# Patient Record
Sex: MALE | Race: WHITE | NOT HISPANIC OR LATINO | Employment: FULL TIME | URBAN - METROPOLITAN AREA
[De-identification: names, ages, dates, MRNs, and addresses within clinical notes are randomized per-mention and may not be internally consistent; named-entity substitution may affect disease eponyms.]

---

## 2022-12-01 ENCOUNTER — TRANSFERRED RECORDS (OUTPATIENT)
Dept: HEALTH INFORMATION MANAGEMENT | Facility: CLINIC | Age: 47
End: 2022-12-01

## 2023-12-16 ENCOUNTER — OFFICE VISIT (OUTPATIENT)
Dept: URGENT CARE | Facility: URGENT CARE | Age: 48
End: 2023-12-16
Payer: COMMERCIAL

## 2023-12-16 ENCOUNTER — ANCILLARY PROCEDURE (OUTPATIENT)
Dept: GENERAL RADIOLOGY | Facility: CLINIC | Age: 48
End: 2023-12-16
Attending: PHYSICIAN ASSISTANT
Payer: COMMERCIAL

## 2023-12-16 VITALS
OXYGEN SATURATION: 97 % | HEART RATE: 59 BPM | TEMPERATURE: 97.3 F | RESPIRATION RATE: 20 BRPM | DIASTOLIC BLOOD PRESSURE: 66 MMHG | SYSTOLIC BLOOD PRESSURE: 122 MMHG | WEIGHT: 201.2 LBS

## 2023-12-16 DIAGNOSIS — R06.02 SHORTNESS OF BREATH: ICD-10-CM

## 2023-12-16 DIAGNOSIS — R05.1 ACUTE COUGH: Primary | ICD-10-CM

## 2023-12-16 PROCEDURE — 93000 ELECTROCARDIOGRAM COMPLETE: CPT | Performed by: PHYSICIAN ASSISTANT

## 2023-12-16 PROCEDURE — 99204 OFFICE O/P NEW MOD 45 MIN: CPT | Mod: 25 | Performed by: PHYSICIAN ASSISTANT

## 2023-12-16 PROCEDURE — 71046 X-RAY EXAM CHEST 2 VIEWS: CPT | Mod: TC | Performed by: RADIOLOGY

## 2023-12-16 RX ORDER — ALBUTEROL SULFATE 90 UG/1
2 AEROSOL, METERED RESPIRATORY (INHALATION) EVERY 6 HOURS
Qty: 18 G | Refills: 0 | Status: SHIPPED | OUTPATIENT
Start: 2023-12-16 | End: 2024-01-05

## 2023-12-16 RX ORDER — BENZONATATE 100 MG/1
100 CAPSULE ORAL 3 TIMES DAILY PRN
Qty: 30 CAPSULE | Refills: 0 | Status: SHIPPED | OUTPATIENT
Start: 2023-12-16 | End: 2023-12-26

## 2023-12-16 RX ORDER — PREDNISONE 20 MG/1
40 TABLET ORAL DAILY
Qty: 10 TABLET | Refills: 0 | Status: SHIPPED | OUTPATIENT
Start: 2023-12-16 | End: 2023-12-21

## 2023-12-16 RX ORDER — DOXYCYCLINE 100 MG/1
100 CAPSULE ORAL 2 TIMES DAILY
Qty: 14 CAPSULE | Refills: 0 | Status: SHIPPED | OUTPATIENT
Start: 2023-12-16 | End: 2023-12-23

## 2023-12-16 NOTE — PROGRESS NOTES
Patient presents with:  URI: Cough with chest congestion for the past week, fatigue, chills/sweats       Clinical Decision Making:  EKG was obtained and showed sinus bradycardia.  No acute findings.  Chest x-ray did not show findings and was within normal limits.  Patient is treated with albuterol, Tessalon Perles and prednisone for the cough and congestion.  Follow-up with primary care provider to reevaluate symptoms. Expected course of resolution and indication for return was gone over and questions were answered to patient/parent's satisfaction before discharge.        ICD-10-CM    1. Acute cough  R05.1 XR Chest 2 Views     albuterol (PROAIR HFA/PROVENTIL HFA/VENTOLIN HFA) 108 (90 Base) MCG/ACT inhaler     benzonatate (TESSALON) 100 MG capsule     predniSONE (DELTASONE) 20 MG tablet     doxycycline hyclate (VIBRAMYCIN) 100 MG capsule     Primary Care Referral      2. Shortness of breath  R06.02 EKG 12-lead complete w/read - Clinics     CANCELED: EKG 12-lead, tracing only          Patient Instructions   You were seen today for acute bronchitis. This is likely due to a viral illness.    Symptom management:  - Get plenty of rest  - Avoid smoking and second hand smoke  - May take tylenol or ibuprofen for fever/discomfort  - Drink plenty of non-caffeinated fluids  - Use nasal steroid spray for sinus congestion  - Albuterol inhaler may be used every 6 hours as needed for chest tightness      Reasons to be seen in the emergency room:  - Develop a fever of 100.4 or higher  - Cough changes, coughing up blood, or become short of breath  - Neck stiffness  - Chest pain  - Severe headache  - Unable to tolerate eating or drinking fluids    Otherwise, if no symptom improvement after 5 days, follow-up with your primary care provider.        HPI:  Garret Sheth is a 48 year old male who presents today for evaluation of cough and chest congestion and fatigue and intermittent dizziness over the last week. Has had nausea and no  vomiting.  Patient also has had chills but no night sweats.  Has not had fever chills night sweats and shortness of breath dyspnea on exertion pleuritic chest pain syncope or presyncope.  COVID test was performed at home 6 days ago and it was negative.    History obtained from chart review and the patient.    Cardiovascular Risk factors:    Male, age 61  Negative Hypertension  Positive hyperlipidemia  Negative diabetes mellitus  Positive obesity  Positive sedentary lifestyle  Positive family history, mother had a heart attack at age 40.  Negative smoker, former smoker from 1998.  Negative alcohol use  Negative illicit street drug use     Problem List:  There are no relevant problems documented for this patient.      History reviewed. No pertinent past medical history.    Social History     Tobacco Use    Smoking status: Never     Passive exposure: Never    Smokeless tobacco: Never   Substance Use Topics    Alcohol use: Not on file       Review of Systems  As above in HPI otherwise negative.    Vitals:    12/16/23 1400   BP: 122/66   BP Location: Right arm   Patient Position: Sitting   Cuff Size: Adult Large   Pulse: 59   Resp: 20   Temp: 97.3  F (36.3  C)   TempSrc: Tympanic   SpO2: 97%   Weight: 91.3 kg (201 lb 3.2 oz)       General: Patient is resting comfortably no acute distress is afebrile  HEENT: Head is normocephalic atraumatic   eyes are PERRL EOMI sclera anicteric   TMs are clear bilaterally  Throat is clear and no exudate  No cervical lymphadenopathy present  LUNGS: Clear to auscultation bilaterally normal respiratory effort and excursion  HEART: Regular rate and rhythm  Skin: Without rash non-diaphoretic    Physical Exam      Labs:  Results for orders placed or performed in visit on 12/16/23   XR Chest 2 Views     Status: None    Narrative    EXAM: XR CHEST 2 VIEWS  LOCATION: [facility]  DATE: 12/16/2023    INDICATION: Acute cough.  COMPARISON: None.      Impression     IMPRESSION:     Lungs are clear. No evidence of pneumonia. No pleural effusions or pneumothorax. Normal cardiac silhouette.     EKG was obtained and shows 53 bpm with normal sinus rhythm with sinus bradycardia.      Radiology:  I have personally ordered and preliminarily reviewed the following xray, I have noted no consolidations.     At the end of the encounter, I discussed results, diagnosis, medications. Discussed red flags for immediate return to clinic/ER, as well as indications for follow up if no improvement. Patient understood and agreed to plan. Patient was stable for discharge.

## 2023-12-28 ENCOUNTER — TELEPHONE (OUTPATIENT)
Dept: FAMILY MEDICINE | Facility: CLINIC | Age: 48
End: 2023-12-28
Payer: COMMERCIAL

## 2023-12-28 NOTE — TELEPHONE ENCOUNTER
Medication Question or Refill        What medication are you calling about (include dose and sig)?: Prednisone refill    Preferred Pharmacy:   [id number] - [place], [place] - [address]  [address] [address]  Phone: [phone] Fax: [phone]      Controlled Substance Agreement on file:   CSA -- Patient Level:    CSA: None found at the patient level.       Who prescribed the medication?: Jhonatan Joshi    Do you need a refill? Yes    When did you use the medication last? Ran out 12/21 but the Albuterol was never filled by [facility]. He just discovered this today, but the congestion is back in full force and thinks that if he can get a refill on the prenisone and take that alongside the albuterol that will help. Same symptoms he came in with before but didn't have the albuterol.    Patient offered an appointment? No, but he will come in if he needs to    Do you have any questions or concerns?  No      Could we send this information to you in [facility] or would you prefer to receive a phone call?:   Patient would prefer a phone call   Okay to leave a detailed message?: Yes at Cell number on file:    Telephone Information:   Mobile [phone]

## 2023-12-29 NOTE — TELEPHONE ENCOUNTER
Spoke with patient and relayed message per Dr. Demarco. Patient expressed understanding. He states he will see how he feels and follow up in UC tomorrow morning if not improving.

## 2023-12-31 ENCOUNTER — HEALTH MAINTENANCE LETTER (OUTPATIENT)
Age: 48
End: 2023-12-31

## 2024-01-05 ENCOUNTER — OFFICE VISIT (OUTPATIENT)
Dept: FAMILY MEDICINE | Facility: CLINIC | Age: 49
End: 2024-01-05
Attending: PHYSICIAN ASSISTANT
Payer: COMMERCIAL

## 2024-01-05 VITALS
DIASTOLIC BLOOD PRESSURE: 79 MMHG | HEART RATE: 60 BPM | HEIGHT: 72 IN | RESPIRATION RATE: 16 BRPM | WEIGHT: 200 LBS | OXYGEN SATURATION: 97 % | BODY MASS INDEX: 27.09 KG/M2 | SYSTOLIC BLOOD PRESSURE: 120 MMHG | TEMPERATURE: 100.8 F

## 2024-01-05 DIAGNOSIS — R05.1 ACUTE COUGH: ICD-10-CM

## 2024-01-05 DIAGNOSIS — J30.2 SEASONAL ALLERGIC RHINITIS, UNSPECIFIED TRIGGER: ICD-10-CM

## 2024-01-05 DIAGNOSIS — Z00.00 HEALTHCARE MAINTENANCE: ICD-10-CM

## 2024-01-05 DIAGNOSIS — Z12.11 SCREEN FOR COLON CANCER: Primary | ICD-10-CM

## 2024-01-05 PROCEDURE — 99396 PREV VISIT EST AGE 40-64: CPT | Performed by: INTERNAL MEDICINE

## 2024-01-05 PROCEDURE — 99213 OFFICE O/P EST LOW 20 MIN: CPT | Mod: 25 | Performed by: INTERNAL MEDICINE

## 2024-01-05 RX ORDER — ALBUTEROL SULFATE 90 UG/1
2 AEROSOL, METERED RESPIRATORY (INHALATION) EVERY 6 HOURS
Qty: 18 G | Refills: 3 | Status: SHIPPED | OUTPATIENT
Start: 2024-01-05

## 2024-01-05 NOTE — PROGRESS NOTES
"SUBJECTIVE:   Garret is a 48 year old, presenting for annual exam and follow up from UC visit for persistent cough - reassuring eval; normal CXR.  Symptoms improved with intermittent albuterol use.  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.  Historically with allergy issues - had seen allergist and had allergy shots.  Maintained on allegra and Nasonex - generally has helped a lot.     Follow-Up and Establish Care        1/5/2024     3:43 PM   Additional Questions   [facility] Car[facility] MORALES       [facility]    Today's PHQ-2 Score:       1/5/2024     3:36 PM   PHQ-2 ( 1999 [employer])   Q1: Little interest or pleasure in doing things 0   Q2: Feeling down, depressed or hopeless 0   PHQ-2 Score 0   Q1: Little interest or pleasure in doing things Not at all   Q2: Feeling down, depressed or hopeless Not at all   PHQ-2 Score 0         Social History     Tobacco Use    Smoking status: Never     Passive exposure: Never    Smokeless tobacco: Never   Substance Use Topics    Alcohol use: Not on file              No data to display                Last PSA: No results found for: \"PSA\"    Reviewed orders with patient. Reviewed health maintenance and updated orders accordingly - Yes      Reviewed and updated as needed this visit by clinical staff   Tobacco  Allergies  Meds  Problems             Reviewed and updated as needed this visit by Provider     Meds  Problems                [unfilled]  CONSTITUTIONAL: NEGATIVE for fever, chills, change in weight  INTEGUMENTARY/SKIN: NEGATIVE for worrisome rashes, moles or lesions  EYES: NEGATIVE for vision changes or irritation  ENT: NEGATIVE for ear, mouth and throat problems  RESP: NEGATIVE for significant cough or SOB  CV: NEGATIVE for chest pain, palpitations or peripheral edema  GI: NEGATIVE for nausea, abdominal pain, heartburn, or change in bowel habits   male: negative for dysuria, hematuria, decreased urinary stream, erectile dysfunction, urethral " discharge  MUSCULOSKELETAL: NEGATIVE for significant arthralgias or myalgia  NEURO: NEGATIVE for weakness, dizziness or paresthesias  PSYCHIATRIC: NEGATIVE for changes in mood or affect    OBJECTIVE:   /79   Pulse 60   Temp (!) 100.8  F (38.2  C)   Resp 16   Ht 1.829 m (6')   Wt 90.7 kg (200 lb)   SpO2 97%   BMI 27.12 kg/m      [unfilled]  GENERAL: healthy, alert and no distress  NECK: no adenopathy, no asymmetry, masses, or scars and thyroid normal to palpation  RESP: lungs clear to auscultation - no rales, rhonchi or wheezes  CV: regular rate and rhythm, normal S1 S2, no S3 or S4, no murmur, click or rub, no peripheral edema and peripheral pulses strong  ABDOMEN: soft, nontender, no hepatosplenomegaly, no masses and bowel sounds normal  MS: no gross musculoskeletal defects noted, no edema    Diagnostic Test Results:  Labs reviewed in Epic    ASSESSMENT/PLAN:     Problem List Items Addressed This Visit          Respiratory    Seasonal allergic rhinitis     Previous use of immunotherapy/allergy shots in [place] - noticeable symptom relief  - maintains on fexofenadine + mometasone intranasal  - no clear asthma history          Relevant Medications    albuterol (PROAIR HFA/PROVENTIL HFA/VENTOLIN HFA) 108 (90 Base) MCG/ACT inhaler       Other    Healthcare maintenance     No significant family history  Arrange for screening colonoscopy  Obtaining [place] medical records - historically has remained current with labs/immunizations  - 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    Weight as of this encounter: 90.7 kg (200 lb).   Weight management plan: Discussed healthy diet and exercise guidelines      He reports that he has never smoked. He has never been exposed to tobacco smoke. He has never used smokeless tobacco.            Mathew Dias MD  [facility]

## 2024-01-07 PROBLEM — Z00.00 HEALTHCARE MAINTENANCE: Status: ACTIVE | Noted: 2024-01-07

## 2024-01-07 RX ORDER — MOMETASONE FUROATE MONOHYDRATE 50 UG/1
2 SPRAY, METERED NASAL DAILY
COMMUNITY
Start: 2024-01-07

## 2024-01-07 RX ORDER — FEXOFENADINE HCL 180 MG/1
180 TABLET ORAL DAILY
COMMUNITY
Start: 2024-01-07

## 2024-01-07 NOTE — ASSESSMENT & PLAN NOTE
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

## 2024-01-07 NOTE — ASSESSMENT & PLAN NOTE
Previous use of immunotherapy/allergy shots in [place] - noticeable symptom relief  - maintains on fexofenadine + mometasone intranasal  - no clear asthma history

## 2025-02-16 ENCOUNTER — HEALTH MAINTENANCE LETTER (OUTPATIENT)
Age: 50
End: 2025-02-16